# Patient Record
Sex: MALE | NOT HISPANIC OR LATINO
[De-identification: names, ages, dates, MRNs, and addresses within clinical notes are randomized per-mention and may not be internally consistent; named-entity substitution may affect disease eponyms.]

---

## 2020-06-21 ENCOUNTER — TRANSCRIPTION ENCOUNTER (OUTPATIENT)
Age: 65
End: 2020-06-21

## 2020-06-22 ENCOUNTER — OUTPATIENT (OUTPATIENT)
Dept: OUTPATIENT SERVICES | Facility: HOSPITAL | Age: 65
LOS: 1 days | Discharge: HOME | End: 2020-06-22
Payer: MEDICAID

## 2020-06-22 PROCEDURE — 88302 TISSUE EXAM BY PATHOLOGIST: CPT | Mod: 26

## 2020-06-23 LAB — SURGICAL PATHOLOGY STUDY: SIGNIFICANT CHANGE UP

## 2022-12-09 ENCOUNTER — NON-APPOINTMENT (OUTPATIENT)
Age: 67
End: 2022-12-09

## 2024-02-01 ENCOUNTER — NON-APPOINTMENT (OUTPATIENT)
Age: 69
End: 2024-02-01

## 2024-02-14 ENCOUNTER — APPOINTMENT (OUTPATIENT)
Dept: OTOLARYNGOLOGY | Facility: CLINIC | Age: 69
End: 2024-02-14
Payer: MEDICARE

## 2024-02-14 DIAGNOSIS — Z21 ASYMPTOMATIC HUMAN IMMUNODEFICIENCY VIRUS [HIV] INFECTION STATUS: ICD-10-CM

## 2024-02-14 DIAGNOSIS — Z78.9 OTHER SPECIFIED HEALTH STATUS: ICD-10-CM

## 2024-02-14 PROBLEM — Z00.00 ENCOUNTER FOR PREVENTIVE HEALTH EXAMINATION: Status: ACTIVE | Noted: 2024-02-14

## 2024-02-14 PROCEDURE — 99203 OFFICE O/P NEW LOW 30 MIN: CPT | Mod: 25

## 2024-02-14 PROCEDURE — 31231 NASAL ENDOSCOPY DX: CPT

## 2024-02-14 RX ORDER — BICTEGRAVIR SODIUM, EMTRICITABINE, AND TENOFOVIR ALAFENAMIDE FUMARATE 30; 120; 15 MG/1; MG/1; MG/1
TABLET ORAL
Refills: 0 | Status: ACTIVE | COMMUNITY

## 2024-02-14 RX ORDER — AMLODIPINE BESYLATE 5 MG/1
TABLET ORAL
Refills: 0 | Status: ACTIVE | COMMUNITY

## 2024-02-14 NOTE — ASSESSMENT
[FreeTextEntry1] : He has a history of nasal congestion and obstruction. He had rhinoplasty and another procedure in the past. On exam he has nasal valve collapse, inferior turbinate hypertrophy and a deviated nasal septum. He also has laryngeal changes consistent with reflux  Plan -findings and management options discussed with the patient. -smoking cessation strongly recommended as it contributes to nasal synmptoms -nasal saline rinses, nasal steroid spray and nasal cones as needed -he may benefit from septoplasty, inferior turbinate reduction and nasal valve repair in the OR.  We also discussed in-0ffice nasal valve remodeling and inferior turbinate reduction. HOwever, he would need to be seen for post-opt checks and he is leaving the country in about 3 weeks. It is unlikely we would be able to perform surgery prior to him leaving -I asked him to obtain his prior records for me to review.  He is unclear which procedure he had performed in the past.

## 2024-02-14 NOTE — HISTORY OF PRESENT ILLNESS
[de-identified] : EBONY FARMER is a 68 year old patient with a long history of nasal congestion and obstruction. It is constant and bilateral. The left side is worse. He has tried nasal saline and nasal steroid sprays without improvement. He did have improvement with nasal cones. He has seen a few ENTS for this and had nasal procedures.  he has no sinus pain or pressure  he does not have allergies although he has not had testing he does not have recurrent sinus infections he had rhinoplasty approximately 40 years ago he had a balloon procedure with an ENT about 5-6 years ago but is not sure of the exact procedure he has no pets at home he denies a history of reflux He is a smoker

## 2024-02-14 NOTE — PHYSICAL EXAM
[TextEntry] : PHYSICAL EXAM   General: The patient was alert, oriented and in no distress. Voice was clear.   Face: The patient had no facial asymmetry or mass. The skin was unremarkable.   Ears: Hearing normal to conversational voice External ears were normal without deformity. Ear canals were clear. No cerumen or inflammation Tympanic membranes were intact and normal. No perforation or effusion. mobile   Nose: The external nose had no significant deformity.  There was no facial tenderness.  On anterior rhinoscopy, the nasal mucosa was normal.  The anterior septum was grossly midline. There were no visualized polyps, purulence or masses. Rosa maneuver was positive for nasal valve collapse   Oral cavity: Oral mucosa- normal. Oral and base of tongue- clear and without mass. Gingival and buccal mucosa- moist and without lesions. Palate- the palate moved well. There was no cleft palate. There appeared to be good salivary flow.  Oral cavity/oropharynx- no pus, erythema or mass   Neck: The neck was symmetrical. The parotid and submandibular glands were normal without masses. The trachea was midline and there was no unusual crepitus. Thyroid was smooth and nontender and no masses were palpated. No masses   Lymphatics: Cervical adenopathy- none.

## 2024-03-01 ENCOUNTER — APPOINTMENT (OUTPATIENT)
Dept: OTOLARYNGOLOGY | Facility: CLINIC | Age: 69
End: 2024-03-01
Payer: MEDICARE

## 2024-03-01 ENCOUNTER — TRANSCRIPTION ENCOUNTER (OUTPATIENT)
Age: 69
End: 2024-03-01

## 2024-03-01 DIAGNOSIS — Z98.890 OTHER SPECIFIED POSTPROCEDURAL STATES: ICD-10-CM

## 2024-03-01 DIAGNOSIS — J31.0 CHRONIC RHINITIS: ICD-10-CM

## 2024-03-01 DIAGNOSIS — J34.2 DEVIATED NASAL SEPTUM: ICD-10-CM

## 2024-03-01 DIAGNOSIS — M95.0 ACQUIRED DEFORMITY OF NOSE: ICD-10-CM

## 2024-03-01 DIAGNOSIS — J34.3 HYPERTROPHY OF NASAL TURBINATES: ICD-10-CM

## 2024-03-01 PROCEDURE — 99214 OFFICE O/P EST MOD 30 MIN: CPT

## 2024-03-04 NOTE — HISTORY OF PRESENT ILLNESS
[de-identified] : Mr. EBONY FARMER is a pleasant 68 year male presenting today as referral from Dr Ambriz for nasal obstruction.   Pt reports h/o long-standing nasal obstruction, left>right. Symptoms have been present for their whole life. The symptoms have been worsening and affecting his sleep. They have tried Flonase in the past without improvement. They have tried Breathe-Right strips or nasal cones but they fall off. They deny h/o allergies. Denies h/o sinus infections. Pt denies h/o nasal trauma. He has undergone cosmetic dorsal hump reduction carried out 40-50 years ago but reports that there was no functional component. He has also undergone ?balloon dilation in-office in 2016 without improvement. He reports that he does get improvement when he distracts his cheeks outward or manually lifts the tip of his nose. Pt was evaluated by Dr Ambriz and nasal endoscopy was completed at that time revealing mild leftward septal deviation. Aesthetically, he has no significant concerns.   Past medical/surgical history includes HIV+, HCV+, HTN. Nonsmoker. Not on anticoagulation. Here by himself. Planning to travel to [place] after the surgery.   SCHNOS-C 5/30; SCHNOS-F 20/20

## 2024-03-04 NOTE — ASSESSMENT
[FreeTextEntry1] : .  Plan: - Patient has significant nasal obstruction refractory to maximal medical therapy and structural anatomic abnormalities that would benefit from nasal airway surgery. This would comprise septoplasty via open approach, nasal valve repair (repair tip ptosis w/ TIG/possible CSEG, bilateral LCSG), and bilateral turbinate reduction. Given revision nature of the surgery, discussed potential need for ear cartilage harvest though this is unlikely. - Discussed in detail the benefits, alternatives, and risks of this procedure which include, but are not limited to, infection, bleeding, scarring, change in appearance, septal perforation, continued nasal obstruction, and need for revision surgery. - The patient reports understanding of these risks, the proposed benefits, and alternatives and wishes to proceed.  We will initiate the authorization/scheduling process.  -- Debo Larson MD Facial Plastic & Reconstructive Surgery